# Patient Record
Sex: FEMALE | Race: WHITE
[De-identification: names, ages, dates, MRNs, and addresses within clinical notes are randomized per-mention and may not be internally consistent; named-entity substitution may affect disease eponyms.]

---

## 2017-03-02 ENCOUNTER — HOSPITAL ENCOUNTER (EMERGENCY)
Dept: HOSPITAL 25 - UCEAST | Age: 52
Discharge: HOME | End: 2017-03-02
Payer: SELF-PAY

## 2017-03-02 VITALS — SYSTOLIC BLOOD PRESSURE: 122 MMHG | DIASTOLIC BLOOD PRESSURE: 80 MMHG

## 2017-03-02 DIAGNOSIS — Z88.5: ICD-10-CM

## 2017-03-02 DIAGNOSIS — Z87.891: ICD-10-CM

## 2017-03-02 DIAGNOSIS — Z88.8: ICD-10-CM
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
## 2017-03-02 DIAGNOSIS — Z88.6: ICD-10-CM

## 2017-03-02 DIAGNOSIS — S80.01XA: ICD-10-CM
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
## 2017-03-02 DIAGNOSIS — S83.91XA: Primary | ICD-10-CM

## 2017-03-02 PROCEDURE — 99213 OFFICE O/P EST LOW 20 MIN: CPT

## 2017-03-02 PROCEDURE — G0463 HOSPITAL OUTPT CLINIC VISIT: HCPCS
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
## 2017-03-02 NOTE — RAD
Indication: Bilateral patellar region knee pain following injury today.



Comparison: No relevant prior exams available [place] the [facility] [place]



Technique: AP, tunnel, lateral, sunrise views bilateral knees. 



Report: 



RIGHT knee: Normal articular alignment and preserved joint spaces. Negative for effusion

or fracture. Mild anterior soft tissue swelling.



LEFT knee: Normal articular alignment. Negative for effusion or fracture.

Chondrocalcinosis involving the hyaline articular cartilage at the medial and lateral

joint compartments and potentially the medial meniscus. Negative for significant joint

space narrowing. Mild anterior soft tissue swelling.



IMPRESSION: Mild bilateral knee anterior soft tissue swelling. No evidence for fracture or

other traumatic injury. Mild degenerative arthropathy at the LEFT knee.

## 2017-08-18 ENCOUNTER — HOSPITAL ENCOUNTER (OUTPATIENT)
Dept: HOSPITAL 25 - ED | Age: 52
Discharge: HOME | End: 2017-08-18
Attending: UROLOGY
Payer: COMMERCIAL

## 2017-08-18 VITALS — SYSTOLIC BLOOD PRESSURE: 94 MMHG | DIASTOLIC BLOOD PRESSURE: 65 MMHG

## 2017-08-18 DIAGNOSIS — Z85.3: ICD-10-CM

## 2017-08-18 DIAGNOSIS — Z87.891: ICD-10-CM

## 2017-08-18 DIAGNOSIS — J45.909: ICD-10-CM

## 2017-08-18 DIAGNOSIS — G43.909: ICD-10-CM

## 2017-08-18 DIAGNOSIS — Z88.5: ICD-10-CM

## 2017-08-18 DIAGNOSIS — F32.9: ICD-10-CM

## 2017-08-18 DIAGNOSIS — N13.2: Primary | ICD-10-CM

## 2017-08-18 DIAGNOSIS — Z88.1: ICD-10-CM

## 2017-08-18 LAB
ALBUMIN SERPL BCG-MCNC: 4 G/DL (ref 3.2–5.2)
ALP SERPL-CCNC: 70 U/L (ref 34–104)
ALT SERPL W P-5'-P-CCNC: 12 U/L (ref 7–52)
ANION GAP SERPL CALC-SCNC: 6 MMOL/L (ref 2–11)
AST SERPL-CCNC: 14 U/L (ref 13–39)
BUN SERPL-MCNC: 25 MG/DL (ref 6–24)
BUN/CREAT SERPL: 23.1 (ref 8–20)
CALCIUM SERPL-MCNC: 8.8 MG/DL (ref 8.6–10.3)
CHLORIDE SERPL-SCNC: 100 MMOL/L (ref 101–111)
GLOBULIN SER CALC-MCNC: 2.4 G/DL (ref 2–4)
GLUCOSE SERPL-MCNC: 123 MG/DL (ref 70–100)
HCO3 SERPL-SCNC: 27 MMOL/L (ref 22–32)
HCT VFR BLD AUTO: 36 % (ref 35–47)
HGB BLD-MCNC: 11.7 G/DL (ref 12–16)
LIPASE SERPL-CCNC: 25 U/L (ref 11–82)
MCH RBC QN AUTO: 28 PG (ref 27–31)
MCHC RBC AUTO-ENTMCNC: 32 G/DL (ref 31–36)
MCV RBC AUTO: 87 FL (ref 80–97)
POTASSIUM SERPL-SCNC: 4 MMOL/L (ref 3.5–5)
PROT SERPL-MCNC: 6.4 G/DL (ref 6.4–8.9)
RBC # BLD AUTO: 4.16 10^6/UL (ref 4–5.4)
SODIUM SERPL-SCNC: 133 MMOL/L (ref 133–145)
WBC # BLD AUTO: 5.3 10^3/UL (ref 3.5–10.8)

## 2017-08-18 PROCEDURE — 81025 URINE PREGNANCY TEST: CPT

## 2017-08-18 PROCEDURE — 82365 CALCULUS SPECTROSCOPY: CPT

## 2017-08-18 PROCEDURE — 80053 COMPREHEN METABOLIC PANEL: CPT

## 2017-08-18 PROCEDURE — 83605 ASSAY OF LACTIC ACID: CPT

## 2017-08-18 PROCEDURE — 36415 COLL VENOUS BLD VENIPUNCTURE: CPT

## 2017-08-18 PROCEDURE — 81003 URINALYSIS AUTO W/O SCOPE: CPT

## 2017-08-18 PROCEDURE — 74176 CT ABD & PELVIS W/O CONTRAST: CPT

## 2017-08-18 PROCEDURE — 81015 MICROSCOPIC EXAM OF URINE: CPT

## 2017-08-18 PROCEDURE — 74420 UROGRAPHY RTRGR +-KUB: CPT

## 2017-08-18 PROCEDURE — 87086 URINE CULTURE/COLONY COUNT: CPT

## 2017-08-18 PROCEDURE — C1876 STENT, NON-COA/NON-COV W/DEL: HCPCS

## 2017-08-18 PROCEDURE — 83690 ASSAY OF LIPASE: CPT

## 2017-08-18 PROCEDURE — 85025 COMPLETE CBC W/AUTO DIFF WBC: CPT

## 2017-08-18 PROCEDURE — 74000: CPT

## 2017-08-18 PROCEDURE — 88300 SURGICAL PATH GROSS: CPT

## 2017-08-18 PROCEDURE — 86140 C-REACTIVE PROTEIN: CPT

## 2017-08-18 NOTE — RAD
HISTORY:  Left flank pain



COMPARISONS: Ultrasound dated July 28, 2017



VIEWS: Frontal views of the abdomen.



FINDINGS: 

BOWEL: There is a nonobstructive bowel gas pattern. There is a large amount of stool

within the colon.

CALCULI: There are no abnormal calculi.

BONES AND SOFT TISSUES: There are no osseous abnormalities.

OTHER FINDINGS: The lung bases are clear. There is no subphrenic gas.



IMPRESSION: 

NO APPRECIABLE NEPHROLITHIASIS

## 2017-08-18 NOTE — RAD
CLINICAL HISTORY: Left flank pain



COMPARISON: December 29, 2010



TECHNIQUE: Multiple contiguous axial CT scans were obtained of the abdomen and pelvis,

without intravenous contrast enhancement. Coronal and sagittal multiplanar reformations

are submitted for review.  Oral contrast was not administered.     



FINDINGS: 

The study is limited by the lack of intravenous contrast. This limits evaluation of the

solid organs and vasculature.





LUNG BASES: The lung bases are clear.



LIVER: The liver is normal in shape, size, contour, and attenuation.

BILE DUCTS: There is no intrahepatic or extrahepatic biliary dilatation.

GALLBLADDER: The gallbladder is normal, without pericholecystic inflammatory change.



PANCREAS: The pancreas is normal, without mass or ductal dilatation.

SPLEEN: Normal in size and appearance.



UPPER GI TRACT: Evaluation of the gastrointestinal tract is limited by incomplete gastric

distention. The upper GI tract is unremarkable.

SMALL BOWEL AND MESENTERY: The small bowel is normal in contour, course, and caliber.

There is no obstruction or dilatation.

COLON: The colon is normal in contour, course, caliber. There is no pericolonic

inflammatory change.



ADRENALS: Normal bilaterally.

KIDNEYS: There is extensive perinephric stranding on the left. There is mild to moderate

pelvic caliectasis without hydroureter. There is a 0.4 cm calculus of the left UVJ.

BLADDER: The bladder is incompletely distended but is grossly normal. As noted above,

there is a left UVJ stone



PELVIC ORGANS: The uterus and adnexa are grossly normal for technique.



AORTA: The aorta is normal.

IVC: Unremarkable



LYMPH NODES: There is no lymphadenopathy by size criteria.



ABDOMINAL WALL: There is no evidence for abdominal wall hernia.

BONES AND SOFT TISSUES: There are mild diffuse degenerative changes. There is mild

stranding of the subcutaneous fat at the left costochondral junction, stable from 2010.

The stability suggests a benign process.

OTHER: None



IMPRESSION:

0.4 CM LEFT UVJ CALCULUS WITH LEFT-SIDED HYDRONEPHROSIS AND PERINEPHRIC STRANDING.

## 2017-08-18 NOTE — ED
Keenan BLACKMON Angela, scribed for Yosi Lu MD on 08/18/17 at 0747 .





Back Pain





- HPI Summary


HPI Summary: 





This pt is a 53 y/[facility] left sided flank pain since 

0100 today. She rates her pain a 7-8 out of 10 in severity. Pt states that she 

was told by her PCP she might have a left urethra kidney stone. Pt reports she 

has been seeing Dr. Mckeon and was on medications for 2 weeks with no 

improvement. She notes she experiences urinary frequency (of about every 2 

minutes), urinary urgency, and dysuria. Pt denies fever, chills, chest pain, 

SOB.





- History of Current Complaint


Chief Complaint: EDFlankPain


Stated Complaint: LEFT FLANK PAIN


Time Seen by Provider: 08/18/17 07:26


Hx Obtained From: Patient


Hx Last Menstrual Period: once or twice per year, on tamoxifan


Onset/Duration: Started Hours Ago


Back Pain Location: Is Discrete @ - left sided flank


Pain Intensity: 7


Pain Scale Used: 0-10 Numeric


Aggravating Symptom(s): Nothing


Alleviating Symptom(s): Nothing


Associated Signs And Symptoms: Positive: Flank Pain - left sided





- Allergies/Home Medications


Allergies/Adverse Reactions: 


 Allergies











Allergy/AdvReac Type Severity Reaction Status Date / Time


 


Propoxyphene [From Darvon] Allergy Severe Nausea And Verified 08/18/17 07:08





   Vomiting  


 


Acetaminophen Allergy  Unknown Verified 08/18/17 07:08





[From Darvocet-N]   Reaction  





   Details  


 


Ketorolac Tromethamine Allergy  Unknown Verified 08/18/17 07:08





[From Toradol]   Reaction  





   Details  


 


NSAIDs Allergy  Unknown Verified 08/18/17 07:08





   Reaction  





   Details  


 


Sulindac [From Clinoril] Allergy  Rash Verified 08/18/17 07:08


 


Codeine AdvReac Severe MULTIPLE Verified 08/18/17 07:08





   COMPLICATIONS  


 


demerol Allergy Severe Nausea And Uncoded 08/18/17 07:08





   Vomiting  


 


morphine Allergy Severe Nausea And Uncoded 08/18/17 07:08





   Vomiting  


 


ibuprofen Allergy Intermediate See Comment Uncoded 08/18/17 07:08











Home Medications: 


 Home Medications





ALPRAZolam TAB* [Xanax TAB*] 0.125 - 0.25 mg PO BEDTIME PRN 08/18/17 [History 

Confirmed 08/18/17]


Albuterol Sulfate [Proair Respiclick] 2 puff INH Q4HR PRN 08/18/17 [History 

Confirmed 08/18/17]


Bupropion XL* [Wellbutrin XL *] 150 mg PO DAILY 08/18/17 [History Confirmed 08/ 18/17]


Cephalexin CAP* [Keflex CAP*] 500 mg PO BEDTIME 08/18/17 [History Confirmed 08/ 18/17]


Cholecalciferol [Vitamin D3 Super Strength] 2,000 unit PO DAILY 08/18/17 [

History Confirmed 08/18/17]


Cyanocobalamin TAB* [Vitamin B12 TAB*] 1,000 mcg PO DAILY 08/18/17 [History 

Confirmed 08/18/17]


FLUoxetine CAP* [PROzac CAP*] 20 mg PO DAILY PRN 08/18/17 [History Confirmed 08/ 18/17]


Montelukast Sodium TAB* [Singulair TAB*] 10 mg PO BEDTIME PRN 08/18/17 [History 

Confirmed 08/18/17]


Pantoprazole TAB (NF) [Protonix TAB (NF)] 40 mg PO BID 08/18/17 [History 

Confirmed 08/18/17]


Propranolol HCl [Propranolol HCl ER] 60 mg PO BID 08/18/17 [History Confirmed 08 /18/17]


Rizatriptan (NF) [Maxalt-Mlt (NF)] 5 mg PO BID MDD 10 mg 08/18/17 [History 

Confirmed 08/18/17]


Tizanidine HCl [Zanaflex] 4 mg PO BEDTIME PRN 08/18/17 [History Confirmed 08/18/ 17]











PMH/Surg Hx/FS Hx/Imm Hx


Endocrine/Hematology History: 


   Denies: Hx Diabetes, Hx Thyroid Disease


Cardiovascular History: Reports: Hx Deep Vein Thrombosis


   Denies: Hx Hypertension, Hx Pacemaker/ICD


Respiratory History: Reports: Hx Asthma - Allergies


   Denies: Hx Chronic Obstructive Pulmonary Disease (COPD)


GI History: Reports: Hx Gastroesophageal Reflux Disease


 History: Reports: Hx Kidney Stones - LAST ONE 5 YRS AGO


   Denies: Hx Dialysis, Hx Renal Disease


Musculoskeletal History: 


   Denies: Hx Rheumatoid Arthritis, Hx Osteoporosis


Sensory History: Reports: Hx Contacts or Glasses - GLASSES


   Denies: Hx Hearing Aid


Opthamlomology History: Reports: Hx Contacts or Glasses - GLASSES


Neurological History: Reports: Hx Migraine - EVERY 2-3 WEEKS; ON INDERAL


Psychiatric History: Reports: Hx Anxiety, Hx Depression, Hx Panic Disorder





- Cancer History


Cancer Type, Location and Year: Breast


Hx Chemotherapy: No


Hx Radiation Therapy: No





- Surgical History


Surgery Procedure, Year, and Place: LEFT BREAST MASTECTOMY WITH TRAM FLAP 2009 

[facility]-sections 1995 & 1997Finger Reconstruction 1989 & 1996LAP DORI 2010 

CMCTONSILLECTOMY AS A CHILD


Hx Anesthesia Reactions: No


Infectious Disease History: No


Infectious Disease History: 


   Denies: Hx Hepatitis, Traveled Outside the [place] in Last 30 Days





- Family History


Known Family History: 


   Negative: Cardiac Disease, Diabetes


Family History: No maligant hyperthermia.  No anesthesia reaction.  Yes breast 

cancer (grandmother, cousin, sister).  Yes COPD (mother).  Yes stroke (

grandmother, 51 y/o)





- Social History


Alcohol Use: Rare


Substance Use Type: Reports: None


Smoking Status (MU): Former Smoker





Review of Systems


Negative: Fever, Chills


Negative: Chest Pain


Negative: Shortness Of Breath


Positive: dysuria, frequency, flank pain - left sided, urgency


Negative: Rash


All Other Systems Reviewed And Are Negative: Yes





Physical Exam





- Summary


Physical Exam Summary: 





 VITAL SIGNS: Reviewed.


GENERAL:  Patient is a well-developed and nourished female who is lying 

comfortable in the stretcher.  Patient is not in any acute respiratory distress.


HEAD AND FACE: No signs of trauma.  No ecchymosis, hematomas or skull 

depressions. No sinus tenderness.


EYES: PERRLA, EOMI x 2, No injected conjunctiva, no nystagmus.


EARS: Hearing grossly intact. 


MOUTH: Oropharynx within normal limits.


NECK: Supple, trachea is midline, no adenopathy, no JVD, no carotid bruit, no c-

spine tenderness, neck with full ROM.


CHEST: Symmetric, no tenderness at palpation


LUNGS: Clear to auscultation bilaterally. No wheezing or crackles.


CVS: Regular rate and rhythm, S1 and S2 present, no murmurs or gallops 

appreciated.


ABDOMEN: Soft, non-tender. No signs of distention. No rebound no guarding, and 

no masses palpated. Bowel sounds are normal.


EXTREMITIES: FROM in all major joints, no edema, no cyanosis or clubbing.


: Positive for left costovertebral angle tenderness.


NEURO: Alert and oriented x 3. No acute neurological deficits. Speech is normal 

and follows commands.


SKIN: Dry and warm


Triage Information Reviewed: Yes


Vital Signs On Initial Exam: 


 Initial Vitals











Temp Pulse Resp BP Pulse Ox


 


 97.5 F   61   16   105/76   97 


 


 08/18/17 07:08  08/18/17 07:08  08/18/17 07:08  08/18/17 07:08  08/18/17 07:08











Vital Signs Reviewed: Yes





- Biju Coma Scale


Coma Scale Total: 15





Diagnostics





- Vital Signs


 Vital Signs











  Temp Pulse Resp BP Pulse Ox


 


 08/18/17 07:19  97.0 F  56  10  108/70  97


 


 08/18/17 07:08  97.5 F  61  16  105/76  97














- Laboratory


Lab Results: 


 Lab Results











  08/18/17 08/18/17 08/18/17 Range/Units





  07:45 07:45 07:45 


 


WBC  5.3    (3.5-10.8)  10^3/ul


 


RBC  4.16    (4.0-5.4)  10^6/ul


 


Hgb  11.7 L    (12.0-16.0)  g/dl


 


Hct  36    (35-47)  %


 


MCV  87    (80-97)  fL


 


MCH  28    (27-31)  pg


 


MCHC  32    (31-36)  g/dl


 


RDW  13    (10.5-15)  %


 


Plt Count  172    (150-450)  10^3/ul


 


MPV  9    (7.4-10.4)  um3


 


Neut % (Auto)  54.5    (38-83)  %


 


Lymph % (Auto)  35.5    (25-47)  %


 


Mono % (Auto)  6.9    (1-9)  %


 


Eos % (Auto)  2.3    (0-6)  %


 


Baso % (Auto)  0.8    (0-2)  %


 


Absolute Neuts (auto)  2.9    (1.5-7.7)  10^3/ul


 


Absolute Lymphs (auto)  1.9    (1.0-4.8)  10^3/ul


 


Absolute Monos (auto)  0.4    (0-0.8)  10^3/ul


 


Absolute Eos (auto)  0.1    (0-0.6)  10^3/ul


 


Absolute Basos (auto)  0    (0-0.2)  10^3/ul


 


Absolute Nucleated RBC  0.01    10^3/ul


 


Nucleated RBC %  0.2    


 


Sodium    133  (133-145)  mmol/L


 


Potassium    4.0  (3.5-5.0)  mmol/L


 


Chloride    100 L  (101-111)  mmol/L


 


Carbon Dioxide    27  (22-32)  mmol/L


 


Anion Gap    6  (2-11)  mmol/L


 


BUN    25 H  (6-24)  mg/dL


 


Creatinine    1.08 H  (0.51-0.95)  mg/dL


 


Est GFR ( Amer)    68.5  (>60)  


 


Est GFR (Non-Af Amer)    53.3  (>60)  


 


BUN/Creatinine Ratio    23.1 H  (8-20)  


 


Glucose    123 H  ()  mg/dL


 


Lactic Acid     (0.5-2.0)  mmol/L


 


Calcium    8.8  (8.6-10.3)  mg/dL


 


Total Bilirubin    0.40  (0.2-1.0)  mg/dL


 


AST    14  (13-39)  U/L


 


ALT    12  (7-52)  U/L


 


Alkaline Phosphatase    70  ()  U/L


 


C-Reactive Protein    1.02  (< 5.00)  mg/L


 


Total Protein    6.4  (6.4-8.9)  g/dL


 


Albumin    4.0  (3.2-5.2)  g/dL


 


Globulin    2.4  (2-4)  g/dL


 


Albumin/Globulin Ratio    1.7  (1-3)  


 


Lipase    25  (11.0-82.0)  U/L


 


Urine Color   Orange   


 


Urine Appearance   Cloudy   


 


Urine pH   5.0   (5-9)  


 


Ur Specific Gravity   1.026   (1.010-1.030)  


 


Urine Protein   TNP   


 


Urine Ketones   TNP   


 


Urine Blood   TNP   


 


Urine Nitrate   TNP   


 


Urine Bilirubin   TNP   


 


Urine Urobilinogen   TNP   


 


Ur Leukocyte Esterase   TNP   


 


Urine WBC (Auto)   Trace(0-5/hpf)   (Absent)  


 


Urine RBC (Auto)   2+(6-10/hpf) H   (Absent)  


 


Ur Squamous Epith Cells   Present H   (Absent)  


 


Calcium Oxalate Crystal   Present H   (Absent)  


 


Urine Bacteria   Absent   (Absent)  


 


Urine Glucose   TNP   


 


Urine Ascorbic Acid   TNP   














  08/18/17 Range/Units





  07:45 


 


WBC   (3.5-10.8)  10^3/ul


 


RBC   (4.0-5.4)  10^6/ul


 


Hgb   (12.0-16.0)  g/dl


 


Hct   (35-47)  %


 


MCV   (80-97)  fL


 


MCH   (27-31)  pg


 


MCHC   (31-36)  g/dl


 


RDW   (10.5-15)  %


 


Plt Count   (150-450)  10^3/ul


 


MPV   (7.4-10.4)  um3


 


Neut % (Auto)   (38-83)  %


 


Lymph % (Auto)   (25-47)  %


 


Mono % (Auto)   (1-9)  %


 


Eos % (Auto)   (0-6)  %


 


Baso % (Auto)   (0-2)  %


 


Absolute Neuts (auto)   (1.5-7.7)  10^3/ul


 


Absolute Lymphs (auto)   (1.0-4.8)  10^3/ul


 


Absolute Monos (auto)   (0-0.8)  10^3/ul


 


Absolute Eos (auto)   (0-0.6)  10^3/ul


 


Absolute Basos (auto)   (0-0.2)  10^3/ul


 


Absolute Nucleated RBC   10^3/ul


 


Nucleated RBC %   


 


Sodium   (133-145)  mmol/L


 


Potassium   (3.5-5.0)  mmol/L


 


Chloride   (101-111)  mmol/L


 


Carbon Dioxide   (22-32)  mmol/L


 


Anion Gap   (2-11)  mmol/L


 


BUN   (6-24)  mg/dL


 


Creatinine   (0.51-0.95)  mg/dL


 


Est GFR ( Amer)   (>60)  


 


Est GFR (Non-Af Amer)   (>60)  


 


BUN/Creatinine Ratio   (8-20)  


 


Glucose   ()  mg/dL


 


Lactic Acid  1.4  (0.5-2.0)  mmol/L


 


Calcium   (8.6-10.3)  mg/dL


 


Total Bilirubin   (0.2-1.0)  mg/dL


 


AST   (13-39)  U/L


 


ALT   (7-52)  U/L


 


Alkaline Phosphatase   ()  U/L


 


C-Reactive Protein   (< 5.00)  mg/L


 


Total Protein   (6.4-8.9)  g/dL


 


Albumin   (3.2-5.2)  g/dL


 


Globulin   (2-4)  g/dL


 


Albumin/Globulin Ratio   (1-3)  


 


Lipase   (11.0-82.0)  U/L


 


Urine Color   


 


Urine Appearance   


 


Urine pH   (5-9)  


 


Ur Specific Gravity   (1.010-1.030)  


 


Urine Protein   


 


Urine Ketones   


 


Urine Blood   


 


Urine Nitrate   


 


Urine Bilirubin   


 


Urine Urobilinogen   


 


Ur Leukocyte Esterase   


 


Urine WBC (Auto)   (Absent)  


 


Urine RBC (Auto)   (Absent)  


 


Ur Squamous Epith Cells   (Absent)  


 


Calcium Oxalate Crystal   (Absent)  


 


Urine Bacteria   (Absent)  


 


Urine Glucose   


 


Urine Ascorbic Acid   











Result Diagrams: 


 08/18/17 07:45





 08/18/17 07:45


Lab Statement: Any lab studies that have been ordered have been reviewed, and 

results considered in the medical decision making process.





- Radiology


  ** Abdomen XR


Xray Interpretation: No Acute Changes - IMPRESSION: No appreciable 

nephrolithiasis.


Radiology Interpretation Completed By: Radiologist





- CT


  ** Abd/Pelvis CT


CT Interpretation: Positive (See Comments) - IMPRESSION: 0.4 cm left UVJ 

calculus with left-sided hydronephrosis and perinephric stranding.


CT Interpretation Completed By: Radiologist





Back Pain Course/Dx





- Course


Assessment/Plan: This pt is a 53 y/o female presenting to [facility] c/o left sided 

flank pain since 0100 today. She rates her pain a 7-8 out of 10 in severity. Pt 

states she has a kidney stone on the left urethra. Pt reports she has been 

seeing Dr. Mckeon and was on medications for 2 weeks with no improvement. She 

notes she experiences urinary frequency (of about every 2 minutes), urinary 

urgency, and dysuria. Pt denies fever, chills, chest pain, SOB. Test results 

are with no significant abnormalities except for BUN of 25, creatinine of 1.08, 

glucose of 123. X-ray is negative for any acute pathology. Pt was given 

initially IV fluids, Zofran for nausea, 2 doses of fentanyl for the pain. The 

pt continued to have pain, therefore I discussed the pt with Dr. Mckeon who 

recommended a CT to rule out a kidney stone. He called back and the pt was 

found to have a ureteral lithiasis of approximately 4 cm . Dr. Mckeon will 

take the pt to the OR for a stent placement, therefore the pt will be 

transferred to the same day surgery area. I discussed the results with the pt 

and is agreeable with this plan. At this time, the pt is hemodynamically stable

, and alert and oriented x3.





- Diagnoses


Differential Diagnosis/HQI/PQRI: Positive: Other - Flank pain, Ureterlithiasis, 

UTI, Pyelopnephritis


Provider Diagnoses: 


 UVJ calculus








Discharge





- Discharge Plan


Condition: Stable


Disposition: ADMITTED TO [facility]





The documentation as recorded by the Keenan caldwell Angela accurately reflects 

the service I personally performed and the decisions made by me, Yosi Lu MD.

## 2017-08-18 NOTE — RAD
INDICATION:  Ureteral stent placement.



COMPARISON:  Comparison is made with a prior CT of the abdomen and pelvis from August 18, 2017.



TECHNIQUE: 11 seconds of intermittent fluoroscopic guidance were provided and 6 spot films

of the abdomen were centered on the left side.



FINDINGS:  There is partial opacification of the left renal collecting system.

Subsequently there is placement of a double-J stent catheter on the left side which

demonstrates normal course.



IMPRESSION:  INTRAOPERATIVE CONTROL FILMS. 



CPT II Codes: 6045F

## 2017-08-19 NOTE — OP
CC:  Dr. Toya Benitez

 

OPERATIVE REPORT:

 

DATE OF OPERATION:  08/18/17

 

DATE OF BIRTH:  [birth year]

 

SURGEON:  Wilfrido Mckeon MD

 

ANESTHESIOLOGIST:  Dr. Ramy Joe.

 

ANESTHESIA:  Spinal.

 

PRE-OP DIAGNOSES:

1.  Distal left ureteral calculus.

2.  Left renal colic due to above.

 

POST-OP DIAGNOSES:

1.  Distal left ureteral calculus.

2.  Left renal colic due to above.

 

OPERATIVE PROCEDURES:

1.  Cystoscopy.

2.  Left ureteroscopy and basketing of distal left ureteral calculus.

3.  Left retrograde pyelography and placement of left ureteral stent (6-Tajik).

 

INDICATIONS:  Ms. Blue is a 52-year-old white female who had past history of 
renal calculus disease and whom I saw recently because of irritative bladder 
symptoms.  She had a positive urine culture and was treated as a case of 
cystitis. Renal ultrasound was normal.

 

She presented to the emergency room today with symptoms of left renal colic. 
Noncontrast CT of the abdomen and pelvis showed a 6-mm calculus at the left 
ureterovesical junction associated with moderate left hydroureteronephrosis.

 

The patient required several doses of IV pain medication for pain control.  She 
continued to be symptomatic.

 

Because of the above history and duration of her symptoms, the patient is taken 
to the operating room for the above procedure.

 

PATHOLOGY AT CYSTOSCOPY:  The bladder mucosa looked normal.  There were no 
suspicious bladder lesions seen.

 

Upon left ureteroscopy, there was a 6-mm calculus that had the gross appearance 
of a calcium oxalate stone that was impacted in the distal ureter just proximal 
to the intramural portion of the ureter.  This stone had irregular edges.  
There was moderate hydronephrosis and concentrated urine above the stone.

 

Retrograde pyelography showed moderate left hydroureteronephrosis with 
tortuosity of the proximal ureter.

 

DESCRIPTION OF PROCEDURE:  After successful spinal anesthesia, the patient was 
placed in the lithotomy position and was prepped and draped for cystoscopy. 
Cystoscopy was performed.  The bladder was inspected and the above findings 
were noted.

 

A flexible tip guidewire was then introduced into the left orifice and 
successfully positioned in the area of the renal pelvis.

 

A size 6.5 semirigid ureteroscope was then introduced inside the bladder.  A 
flexible tip basket was then introduced through the port of the ureteroscope 
and its flexible tip was introduced into the left ureter adjacent to the 
guidewire. That allowed introduction of the ureteroscope with no trauma to the 
ureter.  The calculus was identified.  It was disimpacted.  The stone was then 
basketed and in the process of basketing it, it broke into 2 fragments.  The 
fragments were then extracted with minimal trauma to the ureteral wall and were 
sent for stone analysis.

 

Retrograde pyelography was then performed.  A size 6-Tajik stent was then 
placed with the proximal end coiling in the renal pelvis and the distal end 
coiling inside the bladder.

 

The patient tolerated the procedure well and left the operating room in good 
condition.

 

The patient is to leave the stent in place for about 10 days.  It will be 
removed in the office under local anesthesia.

 

 [id number]/CPS #: [id number]

MTDD

## 2018-07-16 ENCOUNTER — HOSPITAL ENCOUNTER (EMERGENCY)
Dept: HOSPITAL 25 - UCEAST | Age: 53
Discharge: HOME | End: 2018-07-16
Payer: COMMERCIAL

## 2018-07-16 VITALS — DIASTOLIC BLOOD PRESSURE: 69 MMHG | SYSTOLIC BLOOD PRESSURE: 112 MMHG

## 2018-07-16 DIAGNOSIS — Z88.5: ICD-10-CM

## 2018-07-16 DIAGNOSIS — M25.561: ICD-10-CM

## 2018-07-16 DIAGNOSIS — F41.9: ICD-10-CM

## 2018-07-16 DIAGNOSIS — R07.81: Primary | ICD-10-CM

## 2018-07-16 DIAGNOSIS — R10.12: ICD-10-CM

## 2018-07-16 DIAGNOSIS — R06.02: ICD-10-CM

## 2018-07-16 DIAGNOSIS — Z90.49: ICD-10-CM

## 2018-07-16 DIAGNOSIS — M25.461: ICD-10-CM

## 2018-07-16 DIAGNOSIS — Z87.891: ICD-10-CM

## 2018-07-16 DIAGNOSIS — Z88.1: ICD-10-CM

## 2018-07-16 DIAGNOSIS — Z88.8: ICD-10-CM

## 2018-07-16 DIAGNOSIS — K21.9: ICD-10-CM

## 2018-07-16 DIAGNOSIS — Z88.6: ICD-10-CM

## 2018-07-16 PROCEDURE — 71260 CT THORAX DX C+: CPT

## 2018-07-16 PROCEDURE — 99212 OFFICE O/P EST SF 10 MIN: CPT

## 2018-07-16 PROCEDURE — G0463 HOSPITAL OUTPT CLINIC VISIT: HCPCS

## 2018-07-16 PROCEDURE — 74177 CT ABD & PELVIS W/CONTRAST: CPT

## 2018-07-16 NOTE — RAD
INDICATION: Fall. Left-sided pain. Past medical history of mastectomy with TRAM flap

reconstruction. Cholecystectomy



COMPARISON: CT abdomen pelvis August 18, 2017

 

TECHNIQUE: Axial source images were obtained from the thoracic inlet to the symphysis

pubis following administration of oral and intravenous contrast.  100 mL Omnipaque 300 was

utilized. Coronal and sagittal reconstructed images were acquired.



CHEST FINDINGS:



Neck/thyroid: The visualized neck to include the thyroid appear normal.



Chest wall: There are no acute abnormalities of the bony thorax or chest wall. There is

prior left chest wall surgery as indicated clinical history. There is no supraclavicular,

infraclavicular, or axillary lymphadenopathy.



Lungs : There are no pulmonary parenchymal masses or infiltrates. There is no

pneumothorax. The pulmonary interstitium appears normal. There are no endobronchial

lesions.



Cardiomediastinal structures: The heart is normal in size. There is no pericardial

effusion.  There is no evidence of aortic aneurysm or dissection. The pulmonary vessels

appear normal. There is no mediastinal or hilar adenopathy. The esophagus appears normal.



Pleura : There are no pleural-based masses or effusions.



ABDOMINAL/PELVIC FINDINGS:



Liver: The liver is normal in size. There are no masses. There is no ductal dilatation.



Gallbladder: Cholecystectomy.



Spleen: The spleen is normal in size. There are no masses.



Pancreas: There is no evidence of pancreatic mass or ductal dilatation.



Adrenal glands: There is no evidence of adrenal mass.



Kidneys: The kidneys are normal in size and position. There are prompt nephrograms and

there is prompt excretion bilaterally. There are no renal parenchymal masses. There is no

evidence of nephrolithiasis.



Adenopathy: There is no evidence of adenopathy by size criteria.



Fluid collections: There are no free or localized fluid collections.



Vessels:The aorta and IVC appear normal



GI tract: There are no acute CT bowel findings. There is no obstruction. The stomach and

small bowel appear normal. The lower GI tract is normal.  The cecum, ileocecal valve, and

terminal ileum appear normal. The appendix is visualized and appear normal.



Pelvic organs: The uterus and adnexa appear normal



Bladder: There are no bladder masses.



Abdominal and pelvic soft tissues: The extraperitoneal abdominal and pelvic soft tissues

appear normal..



Osseous structures: There are no acute osseous findings.



IMPRESSION:  NO CT EVIDENCE OF ACUTE TRAUMATIC INJURY

## 2018-07-16 NOTE — RAD
Indication: LEFT hip pain post fall.



Comparison: August 18, 2017 CT.



Technique: Noncontrast CT LEFT hip. Multiplanar reformation.



Report: Mild motion artifact noted.



The LEFT hip is normally located. There is mild acetabular and femoral head neck

osteophytic lipping as well as small enthesophytes at the superior and inferior margins of

the greater trochanter. Moderate joint space narrowing at the LEFT hip most prominent at

the posterior medial margin.



No CT evidence for LEFT hip fracture. Negative for joint effusion. Subcutaneous edema

noted laterally. Negative for presence of a loculated soft tissue plane hematoma. 



IMPRESSION:  No CT evidence for LEFT hip fracture. If there is persistent clinical concern

for a CT occult hip fracture consider MRI for further assessment.

## 2018-07-16 NOTE — UC
Truncal Trauma HPI





- HPI Summary


HPI Summary: 





This patient is a 53 year old [place] [facility] [place]/

p fall the occurred one hour ago. Pt states she fell over the banister of the 

stairs and fell 10 foot. The patient rates the pain 8/10 in severity. Patient 

reports left rib pain, LUQ pain, right knee pain, mild SOB, and RLE pain. 

Patient denies head trauma, LOC, neck pain, ABD pain, and HA. The patient was 

encouraged to report to a trauma center immediately but she declined. She 

insisted she have xrays in the urgent care before she does anything else.





- History Of Current Complaint


Stated Complaint: FALL INJURY


Time Seen by Provider: 07/16/18 13:21


Hx Obtained From: Patient


Hx Last Menstrual Period: once or twice per year, on tamoxifan


Onset/Duration: Lasting Hours - 1, Still Present


Severity Initially: Severe


Severity Currently: Severe


Pain Intensity: 8


Pain Scale Used: 0-10 Numeric


Mechanism Of Injury: Blunt Trauma, Fall From Height Of: - 10 foot


Associated Signs And Symptoms: Positive: Negative - HA, LOC, head trauma, SOB





- Allergies/Home Medications


Allergies/Adverse Reactions: 


 Allergies











Allergy/AdvReac Type Severity Reaction Status Date / Time


 


acetaminophen Allergy  Nausea And Verified 07/16/18 13:52





[From Darvocet-N]   Vomiting  


 


ciprofloxacin Allergy  See Comment Verified 07/16/18 13:35


 


codeine Allergy  See Comment Verified 07/16/18 13:56


 


ketorolac Allergy  Unknown Verified 07/16/18 13:53





   Reaction  





   Details  


 


NSAIDS (Non-Steroidal Allergy  Unknown Verified 07/16/18 13:51





Anti-Inflamma   Reaction  





   Details  


 


propoxyphene Allergy  Nausea And Verified 07/16/18 13:51





   Vomiting  


 


sulindac Allergy  Rash Verified 07/16/18 13:54


 


demerol Allergy Severe Nausea And Uncoded 07/16/18 13:35





   Vomiting  


 


morphine Allergy Severe Nausea And Uncoded 07/16/18 13:35





   Vomiting  


 


ibuprofen Allergy Intermediate See Comment Uncoded 07/16/18 13:35














PMH/Surg Hx/FS Hx/Imm Hx


GI/ History: Gastroesophageal Reflux


Psychological History: Anxiety





- Surgical History


Surgical History: Yes


Surgery Procedure, Year, and Place: LEFT BREAST MASTECTOMY WITH TRAM FLAP 2009 

CMCc-sections 1995 & 1997Finger Reconstruction 1989 & 1996LAP DORI 2010 

CMCTONSILLECTOMY AS A CHILD





- Family History


Known Family History: 


   Negative: Cardiac Disease, Diabetes, Seizure Disorder


Family History: No maligant hyperthermia.  No anesthesia reaction.  Yes breast 

cancer (grandmother, cousin, sister).  Yes COPD (mother).  Yes stroke (

grandmother, 49 y/o)





- Social History


Alcohol Use: Rare


Substance Use Type: None


Smoking Status (MU): Former Smoker


When Did the Patient Quit Smoking/Using Tobacco: 1988





Review of Systems


Constitutional: Other - fall


Respiratory: Shortness Of Breath


Gastrointestinal: Abdominal Pain - LUQ


Musculoskeletal: Other: - left rib pain, RLE pain, and right knee pain


All Other Systems Reviewed And Are Negative: Yes





Physical Exam





- Summary


Physical Exam Summary: 





VITAL SIGNS: Reviewed.


GENERAL: Patient is a well-developed and nourished female who is sitting in a 

wheel chair and is not in any acute respiratory distress.


HEAD AND FACE: Normocephalic


EYES: PERRLA, EOMI x 2.


EARS: Hearing grossly intact.


MOUTH: Oropharynx within normal limits.


NECK: Supple, trachea is midline, no adenopathy, no JVD, no carotid bruit.


CHEST: Symmetric, no tenderness at palpation


LUNGS: Clear to auscultation bilaterally. No wheezing or crackles.


CVS: Regular rate and rhythm, S1 and S2 present, no murmurs or gallops 

appreciated.


ABDOMEN: Soft. Bowel sounds are normal. No abdominal abnormal pulsations. TTP 

in the LUQ and left rib cage near the mid axillary line 


EXTREMITIES: TTP and decreased ROM in the right knee 


NEURO: Alert and oriented x 3. No acute neurological deficits. Speech is normal 

and follows commands.


SKIN: Dry and warm





Triage Information Reviewed: Yes


Vital Signs Reviewed: Yes





Diagnostics





- Radiology


  ** abdominal and pelvic CT


Radiology Interpretation Completed By: Radiologist - NO CT EVIDENCE OF ACUTE 

TRAUMATIC INJURY





  ** Chest CT


Radiology Interpretation Completed By: Radiologist - NO CT EVIDENCE OF ACUTE 

TRAUMATIC INJURY





  ** Knee x ray


Radiology Interpretation Completed By: Radiologist - IMPRESSION: PREPATELLAR 

SOFT TISSUE SWELLING.





Re-Evaluation





- Re-Evaluation


  ** First Eval


Re-Evaluation Time: 13:41


Change: Worse


Comment: The pt's pain has increased. I again implored her to take an ambulance 

or at least  herself to a trauma center and she refused again.





  ** Second Eval


Re-Evaluation Time: 13:44


Change: Unchanged


Comment: The patient waved all lab work so renal function could not be checked.





Truncal Trauma Course/Dx





- Course


Course Of Treatment: This patient is a 53-year-old female who presents to the 

urgent care with a chief complaint of having left sided chest pain, left rib 

cage pain and left upper quadrant pain.  The patient also complaining of right 

knee pain after she sustained a fall for 10 feet up.  I immediately recommended 

for the patient to go to a trauma center but the patient refused.  Patient was 

extensively inform about the benefits of risk of going to a trauma center 

versus during the workup and the urgent care.  The patient and the patient's 

daughter refused it.  They want a workup to be done in the urgent care.  Again 

the patient was advice and instructed that we unable to have the appropriate 

equipment and diagnostics for trauma but the patient refused.  The patient and 

the patient's daughter refused to go to a trauma center or any other hospital.  

Since the patient is refusing eye contact the CT suite and spoke with a 

technician for CT and they would be accommodating to do a an abdominal and 

pelvic and chest CT with IV contrast.  Today patient reports that he felt after 

they CTs she missed over a trauma center she will go.  At this point I ordered 

the CT of the chest, abdomen and pelvis with IV contrast awaiting for the test 

results.  I extensively discussed with the patient  the benefits and risk of 

leaving AMA. The primary nurse and the charge nurse also strongly recommended 

that the patient should go to the nearest traum a ED . Patient understands the 

risk of  AMA, which includes but is not restricted to death. Patient is Alert 

and oriented times three and patient verbalizes understanding. Patient has full 

capacity and is cognitively intact. Patient signed the AMA form.  Patient was 

also advised to return to ED if he changes his mind or if the symptoms worsen 

or other symptoms appear. Patient understands and agrees.  Chest, abdomen and 

pelvic CT impression: No acute findings.  X ray of the knee shows no acute 

fracture dislocation.  The patient was given Norco for the pain.  The patient 

reports that she has allergies to codeine which is just on overactive bladder.  

She reports that she has taken this medication before with no other symptoms.  

I was in the prescription for OxyContin and follow up with the primary care 

physician.





- Differential Dx/Diagnosis


Provider Diagnoses: Trauma.  Accidental fall.  LUQ pain.  Rib pain.  Knee pain





Discharge





- Sign-Out/Discharge


Documenting (check all that apply): Patient Departure





- Discharge Plan


Condition: Stable


Disposition: HOME


Prescriptions: 


oxyCODONE TAB* [Roxycodone TAB 5 mg*] 5 mg PO Q6H PRN #12 tab MDD 4


 PRN Reason: Pain


Patient Education Materials:  Acute Abdominal Pain (ED), Knee Pain (ED), 

Arthralgia (ED), Rib Contusion (ED)


Referrals: 


Toya Benitez MD [Primary Care Provider] - 


Additional Instructions: 





Take medications as instructed and adhere to plan


Take Acetaminophen or ibuprofen for pain 


Increase your fluid intake


Return to the  or go to the emergency department if symptoms worsen


Follow-up with primary care physician in next 2-3 days














- Billing Disposition and Condition


Condition: STABLE


Disposition: Home

## 2018-07-16 NOTE — RAD
INDICATION: Right knee injury



COMPARISON: None



TECHNIQUE: AP, lateral, tunnel, and sunrise views were obtained.



FINDINGS: There is no acute fracture or joint effusion. The knee articulates normally.

There is prepatellar soft tissue swelling.



IMPRESSION: PREPATELLAR SOFT TISSUE SWELLING.